# Patient Record
Sex: MALE | Race: BLACK OR AFRICAN AMERICAN | NOT HISPANIC OR LATINO | URBAN - METROPOLITAN AREA
[De-identification: names, ages, dates, MRNs, and addresses within clinical notes are randomized per-mention and may not be internally consistent; named-entity substitution may affect disease eponyms.]

---

## 2024-09-27 ENCOUNTER — OFFICE VISIT (OUTPATIENT)
Dept: URGENT CARE | Age: 49
End: 2024-09-27

## 2024-09-27 VITALS
DIASTOLIC BLOOD PRESSURE: 96 MMHG | SYSTOLIC BLOOD PRESSURE: 177 MMHG | TEMPERATURE: 98.1 F | OXYGEN SATURATION: 97 % | RESPIRATION RATE: 18 BRPM | HEART RATE: 94 BPM

## 2024-09-27 DIAGNOSIS — H61.21 CERUMEN DEBRIS ON TYMPANIC MEMBRANE OF RIGHT EAR: ICD-10-CM

## 2024-09-27 DIAGNOSIS — H66.91 OTITIS OF RIGHT EAR: ICD-10-CM

## 2024-09-27 DIAGNOSIS — H60.311 ACUTE DIFFUSE OTITIS EXTERNA OF RIGHT EAR: ICD-10-CM

## 2024-09-27 DIAGNOSIS — H92.01 OTALGIA OF RIGHT EAR: Primary | ICD-10-CM

## 2024-09-27 RX ORDER — OFLOXACIN 3 MG/ML
4 SOLUTION AURICULAR (OTIC) 2 TIMES DAILY
Qty: 0.28 ML | Refills: 0 | Status: SHIPPED | OUTPATIENT
Start: 2024-09-27 | End: 2024-10-04

## 2024-09-27 ASSESSMENT — ENCOUNTER SYMPTOMS
CONSTITUTIONAL NEGATIVE: 1
ALLERGIC/IMMUNOLOGIC NEGATIVE: 1
MUSCULOSKELETAL NEGATIVE: 1
ENDOCRINE NEGATIVE: 1
EYES NEGATIVE: 1
NEUROLOGICAL NEGATIVE: 1
CARDIOVASCULAR NEGATIVE: 1
GASTROINTESTINAL NEGATIVE: 1
RESPIRATORY NEGATIVE: 1
PSYCHIATRIC NEGATIVE: 1
HEMATOLOGIC/LYMPHATIC NEGATIVE: 1

## 2024-09-27 NOTE — PROGRESS NOTES
Subjective   Patient ID: Priyank Brown is a 48 y.o. male. They present today with a chief complaint of Otitis Media (RT ear clogged x 2 days ).    History of Present Illness  HPI    Past Medical History  Allergies as of 09/27/2024    (No Known Allergies)       (Not in a hospital admission)       History reviewed. No pertinent past medical history.    History reviewed. No pertinent surgical history.     reports that he has never smoked. He has never used smokeless tobacco.    Review of Systems  Review of Systems                               Objective    Vitals:    09/27/24 1117   BP: (!) 177/96   BP Location: Right arm   Patient Position: Sitting   Pulse: 94   Resp: 18   Temp: 36.7 °C (98.1 °F)   SpO2: 97%     No LMP for male patient.    Physical Exam    Ear Cerumen Removal    Date/Time: 9/27/2024 12:10 PM    Performed by: Maegan Carrillo RN  Authorized by: GABINO Dorantes    Consent:     Consent obtained:  Verbal    Consent given by:  Patient    Risks, benefits, and alternatives were discussed: yes    Universal protocol:     Procedure explained and questions answered to patient or proxy's satisfaction: yes    Procedure details:     Location:  L ear and R ear    Procedure type: irrigation      Procedure outcomes: cerumen removed    Post-procedure details:     Inspection:  No bleeding    Hearing quality:  Improved    Procedure completion:  Tolerated      Point of Care Test & Imaging Results from this visit  No results found for this visit on 09/27/24.   No results found.    Diagnostic study results (if any) were reviewed by GABINO Dorantes.    Assessment/Plan   Allergies, medications, history, and pertinent labs/EKGs/Imaging reviewed by Maegan Carrillo RN.     Medical Decision Making    Orders and Diagnoses  Diagnoses and all orders for this visit:  Otalgia of right ear  -     Ear cerumen removal; Future  Otitis of right ear  Cerumen debris on tympanic membrane of right ear  -     Ear cerumen removal;  Future  -     Ear Cerumen Removal  Acute diffuse otitis externa of right ear  -     ofloxacin (Floxin) 0.3 % otic solution; Administer 4 drops into the right ear 2 times a day for 7 days.      Medical Admin Record      Patient disposition: Home    Electronically signed by GABINO Dorantes  12:21 PM

## 2024-09-27 NOTE — PROGRESS NOTES
Subjective   Patient ID: Priyank Brown is a 48 y.o. male. They present today with a chief complaint of Otitis Media (RT ear clogged x 2 days ).    History of Present Illness  49 y/o c/o earwax impaction to right ear, states this happens every 2 years. Denies MARK, HA, fever, chills, d/c      History provided by:  Patient      Past Medical History  Allergies as of 09/27/2024    (No Known Allergies)       (Not in a hospital admission)       History reviewed. No pertinent past medical history.    History reviewed. No pertinent surgical history.     reports that he has never smoked. He has never used smokeless tobacco.    Review of Systems  Review of Systems   Constitutional: Negative.    HENT:  Positive for ear pain.    Eyes: Negative.    Respiratory: Negative.     Cardiovascular: Negative.    Gastrointestinal: Negative.    Endocrine: Negative.    Genitourinary: Negative.    Musculoskeletal: Negative.    Skin:  Negative for rash.   Allergic/Immunologic: Negative.    Neurological: Negative.    Hematological: Negative.    Psychiatric/Behavioral: Negative.                                    Objective    Vitals:    09/27/24 1117   BP: (!) 177/96   BP Location: Right arm   Patient Position: Sitting   Pulse: 94   Resp: 18   Temp: 36.7 °C (98.1 °F)   SpO2: 97%     No LMP for male patient.    Physical Exam  Constitutional:       Appearance: Normal appearance.   HENT:      Head: Normocephalic.      Right Ear: Tympanic membrane is erythematous and bulging.      Left Ear: Tympanic membrane is erythematous.      Ears:      Comments: 80% cerumen impaction to right ear with erythema with edema to right ear canal     Nose: Nose normal.      Mouth/Throat:      Mouth: Mucous membranes are moist.      Pharynx: Oropharynx is clear.   Eyes:      Extraocular Movements: Extraocular movements intact.      Pupils: Pupils are equal, round, and reactive to light.   Cardiovascular:      Rate and Rhythm: Normal rate and regular rhythm.      Pulses:  Normal pulses.      Heart sounds: Normal heart sounds.   Pulmonary:      Effort: Pulmonary effort is normal.      Breath sounds: Normal breath sounds.   Musculoskeletal:         General: Normal range of motion.      Cervical back: Normal range of motion and neck supple.   Skin:     General: Skin is warm and dry.   Neurological:      General: No focal deficit present.      Mental Status: He is alert and oriented to person, place, and time.   Psychiatric:         Mood and Affect: Mood normal.         Behavior: Behavior normal.         Procedures    Point of Care Test & Imaging Results from this visit  No results found for this visit on 09/27/24.   No results found.    Diagnostic study results (if any) were reviewed by GABINO Dorantes.    Assessment/Plan   Allergies, medications, history, and pertinent labs/EKGs/Imaging reviewed by GABINO Dorantes.     Medical Decision Making  Elevated BP, second 160/90- pt states that is his normal and education provided to f/u PCP  suspect right AOe d/t clinical feature s/s progression including PE so will treat with ofloxacin, and advised to implement normal saline mist spray, flonase daily, and if s/s persist after 24-48 hours to f/u with PCP  Cerumen impaction removal to right ear successful and education provided to pt  DO NOT USE qtips  refrain from swimming and flying until f/u with PCP  Normal saline mist spray 3 times a day to clear out sinuses and reduce PND  Advised to take daily anti-histamines   alternate Tylenol and Motrin PRN for discomfort  gargle with warm water and salt  f/u PCP 2-3 days    Increase fluids, to help thin congestion. You might use a cool mist humidifier/vaporizer in your room if the air is dry. This will help thin congestion and keep your sinus moist.  Sleeping in a more upright position can be helpful.  Using saline nose drops or mists can also help thick sinus congestion drain. Apply the mist or drops, then tip your head back  and rotate from side to side to help    You can use acetaminophen (paracetamol, Tylenol) or ibuprofen (Motrin) for fever or headache. Drink warm teas with honey.  If you do not improve or you begin to worsen please follow up with your primary care physician.    SEEK FURTHER TREATMENT IF YOU:  --- You have increasing pain or severe headaches.  --- You have nausea, vomiting, or drowsiness.  --- You have swelling around your face.  --- You have vision problems.  --- You have a stiff neck.  --- You have difficulty breathing.      Orders and Diagnoses  Diagnoses and all orders for this visit:  Otalgia of right ear  -     Ear cerumen removal; Future  Otitis of right ear  Cerumen debris on tympanic membrane of right ear  -     Ear cerumen removal; Future  Acute diffuse otitis externa of right ear  -     ofloxacin (Floxin) 0.3 % otic solution; Administer 4 drops into the right ear 2 times a day for 7 days.      Medical Admin Record      Patient disposition: Home    Electronically signed by GABINO Dorantes  11:59 AM